# Patient Record
Sex: FEMALE | Race: WHITE | NOT HISPANIC OR LATINO
[De-identification: names, ages, dates, MRNs, and addresses within clinical notes are randomized per-mention and may not be internally consistent; named-entity substitution may affect disease eponyms.]

---

## 2017-11-14 ENCOUNTER — APPOINTMENT (OUTPATIENT)
Dept: ENDOCRINOLOGY | Facility: CLINIC | Age: 67
End: 2017-11-14
Payer: COMMERCIAL

## 2017-11-14 VITALS
OXYGEN SATURATION: 98 % | SYSTOLIC BLOOD PRESSURE: 116 MMHG | WEIGHT: 145.25 LBS | HEART RATE: 68 BPM | BODY MASS INDEX: 27.07 KG/M2 | DIASTOLIC BLOOD PRESSURE: 70 MMHG | HEIGHT: 61.5 IN

## 2017-11-14 DIAGNOSIS — Z82.62 FAMILY HISTORY OF OSTEOPOROSIS: ICD-10-CM

## 2017-11-14 PROCEDURE — 99214 OFFICE O/P EST MOD 30 MIN: CPT

## 2017-11-14 RX ORDER — GENTAMICIN SULFATE 40 MG/ML
40 INJECTION, SOLUTION INTRAMUSCULAR; INTRAVENOUS
Qty: 50 | Refills: 0 | Status: COMPLETED | COMMUNITY
Start: 2017-10-02

## 2017-11-14 RX ORDER — BUDESONIDE 0.5 MG/2ML
0.5 INHALANT ORAL
Qty: 120 | Refills: 0 | Status: ACTIVE | COMMUNITY
Start: 2017-08-29

## 2017-11-14 RX ORDER — CLARITHROMYCIN 500 MG/1
500 TABLET, FILM COATED, EXTENDED RELEASE ORAL
Qty: 28 | Refills: 0 | Status: COMPLETED | COMMUNITY
Start: 2017-07-20

## 2017-11-15 LAB — 25(OH)D3 SERPL-MCNC: 28.3 NG/ML

## 2017-11-17 LAB
ALBUMIN SERPL ELPH-MCNC: 4.8 G/DL
ALP BLD-CCNC: 64 U/L
ALT SERPL-CCNC: 21 U/L
ANION GAP SERPL CALC-SCNC: 20 MMOL/L
AST SERPL-CCNC: 18 U/L
BILIRUB SERPL-MCNC: 0.2 MG/DL
BUN SERPL-MCNC: 15 MG/DL
CALCIUM SERPL-MCNC: 10.3 MG/DL
CHLORIDE SERPL-SCNC: 103 MMOL/L
CO2 SERPL-SCNC: 23 MMOL/L
CREAT SERPL-MCNC: 0.72 MG/DL
GLUCOSE SERPL-MCNC: 95 MG/DL
POTASSIUM SERPL-SCNC: 4.4 MMOL/L
PROT SERPL-MCNC: 7.2 G/DL
SODIUM SERPL-SCNC: 146 MMOL/L
TSH SERPL-ACNC: 1.05 UIU/ML

## 2018-01-08 ENCOUNTER — RX RENEWAL (OUTPATIENT)
Age: 68
End: 2018-01-08

## 2019-02-06 ENCOUNTER — RX RENEWAL (OUTPATIENT)
Age: 69
End: 2019-02-06

## 2019-06-25 ENCOUNTER — APPOINTMENT (OUTPATIENT)
Dept: ENDOCRINOLOGY | Facility: CLINIC | Age: 69
End: 2019-06-25
Payer: COMMERCIAL

## 2019-06-25 VITALS
SYSTOLIC BLOOD PRESSURE: 116 MMHG | WEIGHT: 147 LBS | DIASTOLIC BLOOD PRESSURE: 74 MMHG | HEIGHT: 62 IN | BODY MASS INDEX: 27.05 KG/M2 | HEART RATE: 70 BPM | OXYGEN SATURATION: 98 %

## 2019-06-25 DIAGNOSIS — Z00.00 ENCOUNTER FOR GENERAL ADULT MEDICAL EXAMINATION W/OUT ABNORMAL FINDINGS: ICD-10-CM

## 2019-06-25 PROCEDURE — 77080 DXA BONE DENSITY AXIAL: CPT

## 2019-06-25 PROCEDURE — ZZZZZ: CPT

## 2019-06-25 PROCEDURE — 99214 OFFICE O/P EST MOD 30 MIN: CPT | Mod: 25

## 2019-06-25 NOTE — PROCEDURE
[FreeTextEntry1] : Bone mineral density: 06/25/2019 \par Indication: vs outside site BMD 2017 \par Spine: -1.9, osteopenia (prior -2.3)\par Total hip: -2.0, osteopenia (prior -2.4)\par Femoral neck: -2.2 osteopenia (prior -2.8)\par Proximal radius: -2.7, osteoporosis, no significant change \par \par Bone mineral density: 10/2017\par Indication: vs 2015\par Spine: -2.3, osteopenia \par Total hip: -2.4 osteopenia \par Femoral neck: -2.8 osteoporosis \par Proximal radius: -2.7 osteoporosis \par \par \par bone mineral density test August 12, 2015\par Spine -2.0, osteopenia,stable to compared to prior report of 2013\par Total hip -1.7, osteopenia, improved compared to prior report\par Femoral neck -2.5, osteoporosis, stable

## 2019-06-25 NOTE — PHYSICAL EXAM
[No Acute Distress] : no acute distress [Alert] : alert [Well Developed] : well developed [Well Nourished] : well nourished [Normal Sclera/Conjunctiva] : normal sclera/conjunctiva [Normal Oropharynx] : the oropharynx was normal [No Proptosis] : no proptosis [Well Healed Scar] : well healed scar [Thyroid Not Enlarged] : the thyroid was not enlarged [No Thyroid Nodules] : there were no palpable thyroid nodules [No Respiratory Distress] : no respiratory distress [Normal Rate] : heart rate was normal  [Clear to Auscultation] : lungs were clear to auscultation bilaterally [No Accessory Muscle Use] : no accessory muscle use [Regular Rhythm] : with a regular rhythm [Normal S1, S2] : normal S1 and S2 [Not Tender] : non-tender [Normal Bowel Sounds] : normal bowel sounds [Soft] : abdomen soft [Not Distended] : not distended [Anterior Cervical Nodes] : anterior cervical nodes [Post Cervical Nodes] : posterior cervical nodes [Axillary Nodes] : axillary nodes [Spine Straight] : spine straight [No Spinal Tenderness] : no spinal tenderness [Normal] : normal and non tender [No Stigmata of Cushings Syndrome] : no stigmata of cushings syndrome [Normal Gait] : normal gait [Normal Strength/Tone] : muscle strength and tone were normal [No Rash] : no rash [Normal Reflexes] : deep tendon reflexes were 2+ and symmetric [No Tremors] : no tremors [Oriented x3] : oriented to person, place, and time [de-identified] : no cushing's stigmata

## 2019-06-25 NOTE — END OF VISIT
[FreeTextEntry3] : I, Jyoti Vilchis, authored this note working as a medical scribe for Dr. Muse.  06/25/2019.  1:30PM.  This note was authored by Jyoti Vilchis working as medical scribe for me. I have reviewed, edited, and revised the note as needed. I am in agreement with the exam findings, imaging findings, and treatment plan.  Mariano Muse MD

## 2019-06-25 NOTE — ASSESSMENT
[Bisphosphonate Therapy] : Risks  and benefits of bisphosphonate therapy were  discussed with the patient including gastroesophageal irritation, osteonecrosis of the jaw, and atypical femur fractures, and acute phase reaction [FreeTextEntry1] : late f/u for 68 year-old female with \par \par 1. Steroid-induced osteoporosis: previously tolerated Boniva from 2015 to 2016 with stabilization and mild improvement of BMD but pt developed UGI sx. No interval fxs. On drug holiday repeat bone density showed progressive bone loss. Pt was advised that she is at increased risk for future fxs due to osteoporosis. Pt is taking Atelvia correctly since 11/2017. Notably, pt on chronic steroids but very low dose. I gave pt the option to take a drug holiday, and pt preferred to continue Atelvia for another year. \par \par 2. H/o L hemithyroidectomy for benign nodule. Clinically euthyroid. No local neck pain or dysphagia. Observe. \par \par Of note, pt currently does not have PCP and has no routine blood work. I request labs sent out today. Suggest looking for a PCP in [facility] since it is close to where pt lives.\par \par f/u in 1 year.  I discussed how pt will likely be a candidate for drug holiday after 1 year to decrease risk of long term therapy side effects; ONJ or atypical femur fx. \par Eric et al, 2017 American College of Rheumatology guideline for the prevention and treatment of glucocorticoid-induced osteoporosis. [employer], 2017

## 2019-06-25 NOTE — HISTORY OF PRESENT ILLNESS
[Ibandronate (Boniva)] : Ibandronate [Patient taking Meds Correctly] : Patient is taking meds correctly [Taking Steroids] : a history of taking steroids [Family History of Osteoporosis] : family history of osteoporosis [Regular Dental Follow-Up] : regular dental follow-up [FreeTextEntry1] : 68 year-old female with steroid-induced osteoporosis. \par \par Previous bone density tests were moderately low. Pt began Boniva and took it for about one or so years but stopped at the time of a dental implant. She has been off all rx until a repeat bone density showed declining values consistent with osteoporosis and the femoral neck. Restarted Boniva 2015, then stopped on own as noted. Pt previously on Boniva and stopped ~2016,for UGI sx, no fxs. Had implant 2016, healed well. On chronic methylprednisolone; 2 mg every other day for allergic fungal sinusitis pt had bone loss while on drug holiday with BMD 10/2017 :spine -2.3 total hip -2.4 femoral neck -2.8 proximal radius -2.7. c/o hip pain. had x-rays negative. No interval fxs. Pt taking Atelvia since 11/2017. Tolerating well, taking correctly. No ONJ, no interval fx, no UGI, no thigh pain. \par \par Prior endocrine hx is notable for the L thyroid nodule which was benign on a L hemithyroidectomy 2007 at  [facility]. Pt clinically euthyroid after surgery. [Amenorrhea] : no past or present history of amenorrhea [Disordered Eating] : no past or present history of disordered eating [Kidney Stones] : no history of kidney stones [Family History of Breast Cancer] : no family history of breast cancer [Family History of Hip Fracture] : no family history of hip fracture [Hyperparathyroidism] : no hyperparathyroidism [Previous Fragility Fracture] : no previous fragility fracture [FreeTextEntry2] : Atelvia

## 2019-06-26 LAB
25(OH)D3 SERPL-MCNC: 25 NG/ML
ALBUMIN SERPL ELPH-MCNC: 4.6 G/DL
ALP BLD-CCNC: 53 U/L
ALT SERPL-CCNC: 15 U/L
ANION GAP SERPL CALC-SCNC: 14 MMOL/L
AST SERPL-CCNC: 14 U/L
BASOPHILS # BLD AUTO: 0.04 K/UL
BASOPHILS NFR BLD AUTO: 0.4 %
BILIRUB SERPL-MCNC: 0.3 MG/DL
BUN SERPL-MCNC: 10 MG/DL
CALCIUM SERPL-MCNC: 9.4 MG/DL
CHLORIDE SERPL-SCNC: 103 MMOL/L
CHOLEST SERPL-MCNC: 209 MG/DL
CHOLEST/HDLC SERPL: 2.7 RATIO
CO2 SERPL-SCNC: 25 MMOL/L
CREAT SERPL-MCNC: 0.52 MG/DL
EOSINOPHIL # BLD AUTO: 0.7 K/UL
EOSINOPHIL NFR BLD AUTO: 7.7 %
ESTIMATED AVERAGE GLUCOSE: 108 MG/DL
GLUCOSE SERPL-MCNC: 83 MG/DL
HBA1C MFR BLD HPLC: 5.4 %
HCT VFR BLD CALC: 42.5 %
HDLC SERPL-MCNC: 78 MG/DL
HGB BLD-MCNC: 13 G/DL
IMM GRANULOCYTES NFR BLD AUTO: 0.2 %
LDLC SERPL CALC-MCNC: 112 MG/DL
LYMPHOCYTES # BLD AUTO: 3.21 K/UL
LYMPHOCYTES NFR BLD AUTO: 35.3 %
MAN DIFF?: NORMAL
MCHC RBC-ENTMCNC: 28.4 PG
MCHC RBC-ENTMCNC: 30.6 GM/DL
MCV RBC AUTO: 92.8 FL
MONOCYTES # BLD AUTO: 0.55 K/UL
MONOCYTES NFR BLD AUTO: 6 %
NEUTROPHILS # BLD AUTO: 4.58 K/UL
NEUTROPHILS NFR BLD AUTO: 50.4 %
PLATELET # BLD AUTO: 287 K/UL
POTASSIUM SERPL-SCNC: 4.2 MMOL/L
PROT SERPL-MCNC: 6.6 G/DL
RBC # BLD: 4.58 M/UL
RBC # FLD: 14.2 %
SODIUM SERPL-SCNC: 142 MMOL/L
TRIGL SERPL-MCNC: 93 MG/DL
TSH SERPL-ACNC: 1 UIU/ML
WBC # FLD AUTO: 9.1 K/UL

## 2022-03-29 ENCOUNTER — APPOINTMENT (OUTPATIENT)
Dept: ENDOCRINOLOGY | Facility: CLINIC | Age: 72
End: 2022-03-29
Payer: MEDICARE

## 2022-03-29 VITALS — DIASTOLIC BLOOD PRESSURE: 80 MMHG | OXYGEN SATURATION: 98 % | SYSTOLIC BLOOD PRESSURE: 122 MMHG | HEART RATE: 68 BPM

## 2022-03-29 VITALS — WEIGHT: 155 LBS | BODY MASS INDEX: 28.89 KG/M2 | HEIGHT: 61.5 IN | TEMPERATURE: 98 F

## 2022-03-29 DIAGNOSIS — T38.0X5A OTHER OSTEOPOROSIS W/OUT CURRENT PATHOLOGICAL FRACTURE: ICD-10-CM

## 2022-03-29 DIAGNOSIS — M81.0 AGE-RELATED OSTEOPOROSIS W/OUT CURRENT PATHOLOGICAL FRACTURE: ICD-10-CM

## 2022-03-29 DIAGNOSIS — M81.8 OTHER OSTEOPOROSIS W/OUT CURRENT PATHOLOGICAL FRACTURE: ICD-10-CM

## 2022-03-29 PROCEDURE — ZZZZZ: CPT

## 2022-03-29 PROCEDURE — 99213 OFFICE O/P EST LOW 20 MIN: CPT | Mod: 25,GC

## 2022-03-29 PROCEDURE — 77080 DXA BONE DENSITY AXIAL: CPT | Mod: GC

## 2022-03-29 RX ORDER — METHYLPREDNISOLONE 2 MG/1
2 TABLET ORAL
Refills: 0 | Status: DISCONTINUED | COMMUNITY
Start: 2017-07-19 | End: 2022-03-29

## 2022-03-29 RX ORDER — MOMETASONE FUROATE AND FORMOTEROL FUMARATE DIHYDRATE 200; 5 UG/1; UG/1
200-5 AEROSOL RESPIRATORY (INHALATION)
Qty: 13 | Refills: 0 | Status: DISCONTINUED | COMMUNITY
Start: 2017-07-07 | End: 2022-03-29

## 2022-03-29 RX ORDER — RISEDRONATE SODIUM 35 MG/1
35 TABLET, DELAYED RELEASE ORAL
Qty: 3 | Refills: 3 | Status: ACTIVE | COMMUNITY
Start: 2017-11-14 | End: 1900-01-01

## 2022-03-29 NOTE — PHYSICAL EXAM
[Alert] : alert [Well Nourished] : well nourished [No Acute Distress] : no acute distress [Normal Sclera/Conjunctiva] : normal sclera/conjunctiva [EOMI] : extra ocular movement intact [No Proptosis] : no proptosis [No Lid Lag] : no lid lag [Normal Outer Ear/Nose] : the ears and nose were normal in appearance [Thyroid Not Enlarged] : the thyroid was not enlarged [No Thyroid Nodules] : no palpable thyroid nodules [No Respiratory Distress] : no respiratory distress [No Accessory Muscle Use] : no accessory muscle use [Normal Rate and Effort] : normal respiratory rate and effort [Clear to Auscultation] : lungs were clear to auscultation bilaterally [Normal S1, S2] : normal S1 and S2 [No Murmurs] : no murmurs [Normal Rate] : heart rate was normal [Regular Rhythm] : with a regular rhythm [Normal Bowel Sounds] : normal bowel sounds [Not Tender] : non-tender [Not Distended] : not distended [Soft] : abdomen soft [Normal Gait] : normal gait [No Joint Swelling] : no joint swelling seen [No Rash] : no rash [No Skin Lesions] : no skin lesions [Oriented x3] : oriented to person, place, and time [Normal Affect] : the affect was normal [Normal Insight/Judgement] : insight and judgment were intact [Normal Mood] : the mood was normal [Well Healed Scar] : well healed scar [Normal Anterior Cervical Nodes] : no anterior cervical lymphadenopathy [Spine Straight] : spine straight

## 2022-03-29 NOTE — REASON FOR VISIT
[Follow-Up: _____] : a [unfilled] follow-up visit [Follow - Up] : a follow-up visit [Osteoporosis] : osteoporosis [Thyroid nodule/ MNG] : thyroid nodule/ MNG

## 2022-03-31 LAB
ALBUMIN SERPL ELPH-MCNC: 4.6 G/DL
ALP BLD-CCNC: 75 U/L
ALT SERPL-CCNC: 21 U/L
ANION GAP SERPL CALC-SCNC: 13 MMOL/L
AST SERPL-CCNC: 20 U/L
BILIRUB SERPL-MCNC: 0.2 MG/DL
BUN SERPL-MCNC: 10 MG/DL
CALCIUM SERPL-MCNC: 9.5 MG/DL
CALCIUM SERPL-MCNC: 9.5 MG/DL
CHLORIDE SERPL-SCNC: 103 MMOL/L
CO2 SERPL-SCNC: 23 MMOL/L
CREAT SERPL-MCNC: 0.54 MG/DL
EGFR: 100 ML/MIN/1.73M2
GLUCOSE SERPL-MCNC: 88 MG/DL
PARATHYROID HORMONE INTACT: 31 PG/ML
PHOSPHATE SERPL-MCNC: 3.8 MG/DL
POTASSIUM SERPL-SCNC: 4.4 MMOL/L
PROT SERPL-MCNC: 7 G/DL
SODIUM SERPL-SCNC: 140 MMOL/L
TSH SERPL-ACNC: 1.07 UIU/ML

## 2022-03-31 NOTE — ASSESSMENT
[FreeTextEntry1] : 71 year-old female with \par \par 1. Steroid-induced osteoporosis: previously tolerated Boniva from 2015 to 2016 with stabilization and mild improvement of BMD but pt developed UGI sx. No interval fxs. On drug holiday repeat bone density showed progressive bone loss. Started on Atelvia 11/2017, stopped in 2019. Patient now off steroids since 2021. \par BMD 3/2022 indicates worsening osteopenia in the spine, worsening osteoporosis in the femoral neck, stable osteopenia in the hip, and worsening osteoporosis in the radius. Patient needs dental implants, has follow up with surgeon next week.\par Options of medical therapy discussed in great detail.  Recommend restart Atelvia as patient tolerated this well in the past.\par  Plan to restart treatment after implants done. \par \par 2. H/o L hemithyroidectomy for benign nodule. Clinically euthyroid. No local neck pain or dysphagia. Observe. \par \par Of note, pt currently does not have PCP and has no routine blood work. Labs sent today including CMP, PTH, phos, TSH,  \par \par Follow up in 1-2 months

## 2022-03-31 NOTE — PROCEDURE
[FreeTextEntry1] : Bone mineral density: 03/29/2022\par Indication: vs 2019\par Spine: -2.2, osteopenia, -3.3%\par Total hip: -1.9, osteopenia, no significant change\par Femoral neck: -2.6 osteoporosis, -7.0%\par Proximal radius: -3.2, osteoporosis, -6.2%\par \par Bone mineral density: 06/25/2019 \par Indication: vs outside site BMD 2017 \par Spine: -1.9, osteopenia (prior -2.3)\par Total hip: -2.0, osteopenia (prior -2.4)\par Femoral neck: -2.2 osteopenia (prior -2.8)\par Proximal radius: -2.7, osteoporosis, no significant change \par \par Bone mineral density: 10/2017\par Indication: vs 2015\par Spine: -2.3, osteopenia \par Total hip: -2.4 osteopenia \par Femoral neck: -2.8 osteoporosis \par Proximal radius: -2.7 osteoporosis \par \par \par bone mineral density test August 12, 2015\par Spine -2.0, osteopenia,stable to compared to prior report of 2013\par Total hip -1.7, osteopenia, improved compared to prior report\par Femoral neck -2.5, osteoporosis, stable

## 2022-03-31 NOTE — HISTORY OF PRESENT ILLNESS
[Family History of Osteoporosis] : family history of osteoporosis [Regular Dental Follow-Up] : regular dental follow-up [Ibandronate (Boniva)] : Ibandronate [FreeTextEntry1] : Atelvia [Amenorrhea] : no past or present history of amenorrhea [Disordered Eating] : no past or present history of disordered eating [Kidney Stones] : no history of kidney stones [Family History of Breast Cancer] : no family history of breast cancer [Family History of Hip Fracture] : no family history of hip fracture [Hyperparathyroidism] : no hyperparathyroidism [Previous Fragility Fracture] : no previous fragility fracture

## 2022-04-11 LAB — MENADIONE SERPL-MCNC: 0.5 NG/ML
